# Patient Record
Sex: FEMALE | Race: WHITE | NOT HISPANIC OR LATINO | Employment: STUDENT | URBAN - METROPOLITAN AREA
[De-identification: names, ages, dates, MRNs, and addresses within clinical notes are randomized per-mention and may not be internally consistent; named-entity substitution may affect disease eponyms.]

---
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
## 2024-07-27 ENCOUNTER — HOSPITAL ENCOUNTER (EMERGENCY)
Facility: HOSPITAL | Age: 19
Discharge: HOME | End: 2024-07-27
Attending: EMERGENCY MEDICINE
Payer: COMMERCIAL

## 2024-07-27 VITALS
HEART RATE: 104 BPM | OXYGEN SATURATION: 100 % | SYSTOLIC BLOOD PRESSURE: 143 MMHG | DIASTOLIC BLOOD PRESSURE: 86 MMHG | RESPIRATION RATE: 20 BRPM | TEMPERATURE: 98.1 F

## 2024-07-27 DIAGNOSIS — H60.90 OTITIS EXTERNA: ICD-10-CM

## 2024-07-27 DIAGNOSIS — H66.90 OTITIS MEDIA: Primary | ICD-10-CM

## 2024-07-27 PROCEDURE — 99282 EMERGENCY DEPT VISIT SF MDM: CPT

## 2024-07-27 PROCEDURE — 99284 EMERGENCY DEPT VISIT MOD MDM: CPT | Performed by: EMERGENCY MEDICINE

## 2024-07-27 RX ORDER — AMOXICILLIN 500 MG/1
1000 CAPSULE ORAL 2 TIMES DAILY
Qty: 21 CAPSULE | Refills: 0 | Status: SHIPPED | OUTPATIENT
Start: 2024-07-27 | End: 2024-08-03

## 2024-07-27 RX ORDER — NEOMYCIN SULFATE, POLYMYXIN B SULFATE AND HYDROCORTISONE 10; 3.5; 1 MG/ML; MG/ML; [USP'U]/ML
4 SUSPENSION/ DROPS AURICULAR (OTIC) ONCE
Status: COMPLETED | OUTPATIENT
Start: 2024-07-27 | End: 2024-07-27

## 2024-07-27 RX ORDER — AMOXICILLIN 250 MG/1
1000 CAPSULE ORAL ONCE
Status: COMPLETED | OUTPATIENT
Start: 2024-07-27 | End: 2024-07-27

## 2024-07-27 RX ADMIN — AMOXICILLIN 1000 MG: 250 CAPSULE ORAL at 14:16

## 2024-07-27 RX ADMIN — NEOMYCIN AND POLYMYXIN B SULFATES AND HYDROCORTISONE OTIC 4 DROP: 10; 3.5; 1 SUSPENSION AURICULAR (OTIC) at 14:18

## 2024-07-27 NOTE — ED PROVIDER NOTES
"History  Chief Complaint   Patient presents with    Earache     Pt states \"I have an ear infection for maybe 4 days now\" Denies any discharge and fevers.      This is a 19-year-old female presents with right ear pain over the past 4 days [place]      History provided by:  Patient  Medical Problem  Location:  Right ear  Quality:  Achy pain  Severity:  Moderate  Onset quality:  Gradual  Duration:  4 days  Timing:  Constant  Progression:  Worsening  Chronicity:  New  Context:  Right ear pain worsening over the past 4 days  Worsened by:  Movement of the ear  Associated symptoms: ear pain        Prior to Admission Medications   Prescriptions Last Dose Informant Patient Reported? Taking?   norgestimate-ethinyl estradiol (ORTHO TRI-CYCLEN,TRINESSA) 0.18/0.215/0.25 MG-35 MCG per tablet   Yes No   Patient not taking: Reported on 5/4/2023      Facility-Administered Medications: None       History reviewed. No pertinent past medical history.    History reviewed. No pertinent surgical history.    History reviewed. No pertinent family history.  I have reviewed and agree with the history as documented.    E-Cigarette/Vaping     E-Cigarette/Vaping Substances     Social History     Tobacco Use    Smoking status: Every Day     Types: Cigarettes    Smokeless tobacco: Never   Substance Use Topics    Drug use: Yes     Types: Marijuana       Review of Systems   HENT:  Positive for ear pain.    All other systems reviewed and are negative.      Physical Exam  Physical Exam  Vitals and nursing note reviewed.   Constitutional:       General: She is not in acute distress.     Appearance: She is not toxic-appearing.   HENT:      Head: Normocephalic and atraumatic.      Left Ear: Tympanic membrane, ear canal and external ear normal.      Ears:      Comments: Right external auditory canal swelling and tenderness with movement also some erythema to the tympanic membrane  Eyes:      Extraocular Movements: Extraocular " movements intact.      Pupils: Pupils are equal, round, and reactive to light.   Cardiovascular:      Rate and Rhythm: Normal rate and regular rhythm.      Pulses: Normal pulses.      Heart sounds: No murmur heard.     No friction rub. No gallop.   Pulmonary:      Effort: Pulmonary effort is normal. No respiratory distress.      Breath sounds: No stridor. No wheezing, rhonchi or rales.   Abdominal:      General: There is no distension.      Palpations: Abdomen is soft.      Tenderness: There is no abdominal tenderness.   Musculoskeletal:         General: No swelling, tenderness, deformity or signs of injury. Normal range of motion.      Cervical back: Normal range of motion and neck supple. No rigidity.      Right lower leg: No edema.      Left lower leg: No edema.   Skin:     General: Skin is warm and dry.      Coloration: Skin is not jaundiced.      Findings: No bruising, erythema or rash.   Neurological:      General: No focal deficit present.      Mental Status: She is alert and oriented to person, place, and time.      Cranial Nerves: No cranial nerve deficit.      Sensory: No sensory deficit.   Psychiatric:         Mood and Affect: Mood normal.         Behavior: Behavior normal.         Vital Signs  ED Triage Vitals [07/27/24 1322]   Temperature Pulse Respirations Blood Pressure SpO2   98.1 °F (36.7 °C) 104 20 143/86 100 %      Temp Source Heart Rate Source Patient Position - Orthostatic VS BP Location FiO2 (%)   Temporal Monitor -- -- --      Pain Score       4           Vitals:    07/27/24 1322   BP: 143/86   Pulse: 104         Visual Acuity      ED Medications  Medications   amoxicillin (AMOXIL) capsule 1,000 mg (1,000 mg Oral Given 7/27/24 1416)   neomycin-polymyxin-hydrocortisone (CORTISPORIN) 0.35%-10,000 units/mL-1% otic suspension 4 drop (4 drops Right Ear Given 7/27/24 1418)       Diagnostic Studies  Results Reviewed       None                   No orders to display         "      Procedures  Procedures         ED Course         CRAFFT      Flowsheet Row Most Recent Value   SEMAJ Initial Screen: During the past 12 months, did you:    1. Drink any alcohol (more than a few sips)?  No Filed at: 07/27/2024 0133   2. Smoke any marijuana or hashish No Filed at: 07/27/2024 0133   3. Use anything else to get high? (\"anything else\" includes illegal drugs, over the counter and prescription drugs, and things that you sniff or 'hester')? No Filed at: 07/27/2024 0133                                              Medical Decision Making  Right ear pain examination consistent with otitis externa in addition to otitis media will treat with antibiotic drops and oral antibiotics    Risk  Prescription drug management.                 Disposition  Final diagnoses:   Otitis media - Right-sided   Otitis externa - Right side     Time reflects when diagnosis was documented in both MDM as applicable and the Disposition within this note       Time User Action Codes Description Comment    7/27/2024  1:52 PM Shady Brown [H66.90] Otitis media     7/27/2024  1:52 PM Shady Brown [H66.90] Otitis media Right-sided    7/27/2024  1:52 PM Shady Brown [H60.90] Otitis externa     7/27/2024  1:52 PM Shady Brown [H60.90] Otitis externa Right side          ED Disposition       ED Disposition   Discharge    Condition   --    Date/Time   Sat Jul 27, 2024 1420    Comment   Keisha Sutton discharge to home/self care.                   Follow-up Information       Follow up With Specialties Details Why Contact Info Additional Information     [facility] Emergency [facility]  As needed, If symptoms worsen [address] [facility] [address] [phone] [facility], [address] [place], [place] [address]    [employer]   As needed primary care physician follow-up [phone]         "       Discharge Medication List as of 7/27/2024  1:55 PM        START taking these medications    Details   amoxicillin (AMOXIL) 500 mg capsule Take 2 capsules (1,000 mg total) by mouth 2 (two) times a day for 7 days, Starting Sat 7/27/2024, Until Sat 8/3/2024, Normal           CONTINUE these medications which have NOT CHANGED    Details   norgestimate-ethinyl estradiol (ORTHO TRI-CYCLEN,TRINESSA) 0.18/0.215/0.25 MG-35 MCG per tablet Starting Sat 2/4/2023, Historical Med             No discharge procedures on file.    PDMP Review       None            ED Provider  Electronically Signed by             Shady Brown DO  07/27/24 8215